# Patient Record
Sex: MALE | Race: WHITE
[De-identification: names, ages, dates, MRNs, and addresses within clinical notes are randomized per-mention and may not be internally consistent; named-entity substitution may affect disease eponyms.]

---

## 2018-05-24 ENCOUNTER — HOSPITAL ENCOUNTER (EMERGENCY)
Dept: HOSPITAL 43 - DL.ED | Age: 73
Discharge: HOME | End: 2018-05-24
Payer: MEDICARE
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
## 2018-05-24 DIAGNOSIS — F17.210: ICD-10-CM

## 2018-05-24 DIAGNOSIS — Z79.899: ICD-10-CM

## 2018-05-24 DIAGNOSIS — S46.912A: Primary | ICD-10-CM

## 2018-05-24 DIAGNOSIS — E11.9: ICD-10-CM

## 2018-05-24 DIAGNOSIS — Z79.82: ICD-10-CM

## 2018-05-24 NOTE — CR
Clinical history: 73-year-old male injured (hit) left shoulder this morning. Dislocation?

 

Interpretation: 3 views left shoulder confirm chronic arthritic changes acromioclavicular joint and e
levation of the humeral head suggesting possible rotator cuff damage i.e. impingement or tear.

 

No juxta-articular soft tissue calcification identified in the course of the rotator cuff tendon.

 

No sign of pathologic skeletal lesion, acute left shoulder fracture, acromioclavicular separation or 
glenohumeral dislocation.

 

Left lung apex clear.

 

CONCLUSION: Possible rotator cuff injury. Clinical? No acute fracture or dislocation left shoulder. A
rthritis A-C joint.

## 2018-05-24 NOTE — EDM.PDOC
ED HPI GENERAL MEDICAL PROBLEM





- General


Chief Complaint: Upper Extremity Injury/Pain


Stated Complaint: LEFT SHOULDER


Time Seen by Provider: 05/24/18 13:25


Source of Information: Reports: Patient


History Limitations: Reports: No Limitations





- History of Present Illness


INITIAL COMMENTS - FREE TEXT/NARRATIVE: 





This 74 yo male patient came into the ED due to left shoulder pain and 

inability to raise his arm. The patient reports that he was walking through his 

camper this morning with the slide outs in and his his shoulder on the slide 

out. The patient has had increased pain and the inability to abduct his arm 

since the injury. 


Onset: Today


Onset Date: 05/24/18


Onset Time: 06:30


Duration: Constant


Location: Reports: Upper Extremity, Left


Quality: Reports: Ache, Dull


Severity: Moderate


Improves with: Reports: None


Worsens with: Reports: None


Context: Reports: Trauma


Associated Symptoms: Reports: No Other Symptoms


Treatments PTA: Reports: Acetaminophen


  ** Left Shoulder


Pain Score (Numeric/FACES): 2





- Related Data


 Allergies











Allergy/AdvReac Type Severity Reaction Status Date / Time


 


No Known Allergies Allergy   Verified 05/24/18 13:06











Home Meds: 


 Home Meds





Aspirin [Halfprin] 81 mg PO DAILY 05/24/18 [History]


Bumetanide 2 mg PO BID 05/24/18 [History]


Carvedilol [Coreg] 25 mg PO BID 05/24/18 [History]


Losartan/Hydrochlorothiazide [Hyzaar 100-25 Tablet] 1 tab PO DAILY 05/24/18 [

History]


Nitroglycerin [Nitrostat] 0.4 mg PO ASDIRECTED PRN 05/24/18 [History]


Potassium Chloride 40 meq PO BID 05/24/18 [History]


amLODIPine Besylate [Norvasc] 10 mg PO DAILY 05/24/18 [History]


atorvaSTATin [Lipitor] 20 mg PO DAILY 05/24/18 [History]


metFORMIN [Glucophage XR] 1,000 mg PO BID 05/24/18 [History]











Past Medical History


HEENT History: Reports: Impaired Vision


Other HEENT History: wears glasses


Cardiovascular History: Reports: Stents


Respiratory History: Reports: None


Gastrointestinal History: Reports: None


Genitourinary History: Reports: None


Musculoskeletal History: Reports: Back Pain, Chronic


Neurological History: Reports: None


Psychiatric History: Reports: None


Endocrine/Metabolic History: Reports: Diabetes, Type II


Hematologic History: Reports: None


Immunologic History: Reports: None


Oncologic (Cancer) History: Reports: None


Dermatologic History: Reports: None





- Infectious Disease History


Infectious Disease History: Reports: Measles





- Past Surgical History


Head Surgeries/Procedures: Reports: None


HEENT Surgical History: Reports: Tonsillectomy


Musculoskeletal Surgical History: Reports: Knee Replacement





Social & Family History





- Tobacco Use


Smoking Status *Q: Current Every Day Smoker


Years of Tobacco use: 60


Packs/Tins Daily: 1


Second Hand Smoke Exposure: No





- Caffeine Use


Caffeine Use: Reports: None





- Recreational Drug Use


Recreational Drug Use: No





Review of Systems





- Review of Systems


Review Of Systems: ROS reveals no pertinent complaints other than HPI.





ED EXAM, GENERAL





- Physical Exam


Exam: See Below


Exam Limited By: No Limitations


General Appearance: Alert, WD/WN, Moderate Distress


Eye Exam: Bilateral Eye: EOMI, Normal Inspection, PERRL


Ears: Normal External Exam, Normal Canal, Hearing Grossly Normal, Normal TMs


Nose: Normal Inspection, Normal Mucosa, No Blood


Throat/Mouth: Normal Inspection, Normal Lips, Normal Teeth, Normal Gums, Normal 

Oropharynx, Normal Voice, No Airway Compromise


Head: Atraumatic, Normocephalic


Neck: Normal Inspection, Supple, Non-Tender, Full Range of Motion


Respiratory/Chest: No Respiratory Distress, Lungs Clear, Normal Breath Sounds, 

No Accessory Muscle Use, Chest Non-Tender


Cardiovascular: Normal Peripheral Pulses, Regular Rate, Rhythm, No Edema, No 

Gallop, No JVD, No Murmur, No Rub


GI/Abdominal: Normal Bowel Sounds, Soft, Non-Tender, No Organomegaly, No 

Distention, No Abnormal Bruit, No Mass


 (Male) Exam: Deferred


Rectal (Males) Exam: Deferred


Back Exam: Normal Inspection, Full Range of Motion, NT


Extremities: Normal Inspection, Non-Tender, No Pedal Edema, Normal Capillary 

Refill, Arm Pain (left shoulder pain), Limited Range of Motion (pain with 

abduction)


Neurological: Alert, Oriented, CN II-XII Intact, Normal Cognition, Normal Gait, 

Normal Reflexes, No Motor/Sensory Deficits


Psychiatric: Normal Affect, Normal Mood


Skin Exam: Warm, Dry, Intact, Normal Color, No Rash


Lymphatic: No Adenopathy





Course





- Vital Signs


Last Recorded V/S: 


 Last Vital Signs











Temp  36.3 C   05/24/18 12:56


 


Pulse  70   05/24/18 12:56


 


Resp  16   05/24/18 12:56


 


BP  141/77 H  05/24/18 12:56


 


Pulse Ox  96   05/24/18 12:56














Departure





- Departure


Time of Disposition: 14:01


Disposition: Home, Self-Care 01


Condition: Fair


Clinical Impression: 


Left shoulder strain


Qualifiers:


 Encounter type: initial encounter Qualified Code(s): S46.912A - Strain of 

unspecified muscle, fascia and tendon at shoulder and upper arm level, left arm

, initial encounter








- Discharge Information


Instructions:  Shoulder Pain, Easy-to-Read, How to Use a Sling, Easy-to-Read, 

Muscle Strain, Easy-to-Read


Forms:  ED Department Discharge


Care Plan Goals: 


The patient was advised of the examination and x-ray results during the visit. 

The patient was placed in a sling for support of his arm and shoulder. The 

patient was encouraged to rest and ice the shoulder. If the patient continues 

to have pain with movement, the patient should follow-up with his primary care 

facility, orthopedic specialist or return to the emergency department.